# Patient Record
Sex: MALE | Race: OTHER | NOT HISPANIC OR LATINO | Employment: FULL TIME | URBAN - METROPOLITAN AREA
[De-identification: names, ages, dates, MRNs, and addresses within clinical notes are randomized per-mention and may not be internally consistent; named-entity substitution may affect disease eponyms.]

---

## 2020-02-09 ENCOUNTER — HOSPITAL ENCOUNTER (EMERGENCY)
Facility: HOSPITAL | Age: 30
Discharge: HOME | End: 2020-02-09
Attending: EMERGENCY MEDICINE | Admitting: EMERGENCY MEDICINE
Payer: COMMERCIAL

## 2020-02-09 ENCOUNTER — APPOINTMENT (EMERGENCY)
Dept: RADIOLOGY | Facility: HOSPITAL | Age: 30
End: 2020-02-09
Payer: COMMERCIAL

## 2020-02-09 VITALS
TEMPERATURE: 98.1 F | DIASTOLIC BLOOD PRESSURE: 78 MMHG | OXYGEN SATURATION: 100 % | RESPIRATION RATE: 20 BRPM | WEIGHT: 150 LBS | SYSTOLIC BLOOD PRESSURE: 149 MMHG | HEART RATE: 98 BPM

## 2020-02-09 DIAGNOSIS — M25.552 LEFT HIP PAIN: Primary | ICD-10-CM

## 2020-02-09 DIAGNOSIS — M25.562 LEFT KNEE PAIN: ICD-10-CM

## 2020-02-09 PROCEDURE — 73502 X-RAY EXAM HIP UNI 2-3 VIEWS: CPT

## 2020-02-09 PROCEDURE — 99284 EMERGENCY DEPT VISIT MOD MDM: CPT | Performed by: PHYSICIAN ASSISTANT

## 2020-02-09 PROCEDURE — 99283 EMERGENCY DEPT VISIT LOW MDM: CPT

## 2020-02-09 PROCEDURE — 73564 X-RAY EXAM KNEE 4 OR MORE: CPT

## 2020-02-09 RX ORDER — OXYCODONE HYDROCHLORIDE AND ACETAMINOPHEN 5; 325 MG/1; MG/1
1 TABLET ORAL EVERY 4 HOURS PRN
Qty: 10 TABLET | Refills: 0 | Status: SHIPPED | OUTPATIENT
Start: 2020-02-09 | End: 2020-02-12

## 2020-02-09 NOTE — DISCHARGE INSTRUCTIONS
Arthritis   WHAT YOU NEED TO KNOW:   Arthritis is a disease that causes inflammation in one or more joints  There are many types of arthritis, such as osteoarthritis, rheumatoid arthritis, and septic arthritis  Some types cause inflammation in the joints  Other types wear away the cartilage between joints  This makes the bones of the joint rub together when you move the joint  Your symptoms may be constant, or symptoms may come and go  Arthritis often gets worse over time and can cause permanent joint damage  DISCHARGE INSTRUCTIONS:   Return to the emergency department if:   · You have a fever and severe joint pain or swelling  · You cannot move the affected joint  · You have severe joint pain you cannot tolerate  Contact your healthcare provider if:   · Your pain or swelling does not get better with treatment  · You have questions or concerns about your condition or care  Medicines:   · Acetaminophen  decreases pain and fever  It is available without a doctor's order  Ask how much to take and how often to take it  Follow directions  Acetaminophen can cause liver damage if not taken correctly  · NSAIDs , such as ibuprofen, help decrease swelling, pain, and fever  This medicine is available with or without a doctor's order  NSAIDs can cause stomach bleeding or kidney problems in certain people  If you take blood thinner medicine, always ask your healthcare provider if NSAIDs are safe for you  Always read the medicine label and follow directions  · Steroids  reduce swelling and pain  · Prescription pain medicine  may be given  Do not wait until the pain is severe before you take your medicine  Ask your healthcare provider how to take this medicine safely  · Take your medicine as directed  Contact your healthcare provider if you think your medicine is not helping or if you have side effects  Tell him of her if you are allergic to any medicine   Keep a list of the medicines, vitamins, and herbs you take  Include the amounts, and when and why you take them  Bring the list or the pill bottles to follow-up visits  Carry your medicine list with you in case of an emergency  Follow up with your healthcare provider or rheumatologist as directed:  Write down your questions so you remember to ask them during your visits  Manage arthritis:   · Rest your painful joint so it can heal   Your healthcare provider may recommend crutches or a walker if the affected joint is in a leg  · Apply ice or heat to the joint  Both can help decrease swelling and pain  Ice may also help prevent tissue damage  Use an ice pack, or put crushed ice in a plastic bag  Cover it with a towel and place it on your joint for 15 to 20 minutes every hour or as directed  You can apply heat for 20 minutes every 2 hours  Heat treatment includes hot packs or heat lamps  · Elevate your joint  Elevation helps reduce swelling and pain  Raise your joint above the level of your heart as often as you can  Prop your painful joint on pillows to keep it above your heart comfortably  · Go to therapy as directed  A physical therapist can teach you exercises to improve flexibility and range of motion  You may also be shown non-weight-bearing exercises that are safe for your joints, such as swimming  Exercise can help keep your joints flexible and reduce pain  An occupational therapist can help you learn to do your daily activities when your joints are stiff or sore  · Maintain a healthy weight  Extra weight puts increased pressure on your joints  Ask your healthcare provider what you should weigh  If you need to lose weight, he can help you create a weight loss program  Weight loss can help reduce pain and increase your ability to do your activities  The amount of exercise you do may vary each day, depending on your symptoms  · Wear flat or low-heeled shoes    This will help decrease pain and reduce pressure on your ankle, knee, and hip joints  · Use support devices  You may be given splints to wear on your hands to help your joints rest and to decrease inflammation  While you sleep, use a pillow that is firm enough to support your neck and head  Support equipment:  The following may help you move and prevent falls:  · Orthotic shoes or insoles  help support your feet when you walk  · Crutches, a cane, or a walker  may help decrease your risk for falling  They also decrease stress on affected joints  · Devices to prevent falls  include raised toilet seats and bathtub bars to help you get up from sitting  Handrails can be placed in areas where you need balance and support  © 2017 [facility] Information is for End User's use only and may not be sold, redistributed or otherwise used for commercial purposes  All illustrations and images included in CareNotes® are the copyrighted property of A D A M , Inc  or [employer] Quiroz  The above information is an  only  It is not intended as medical advice for individual conditions or treatments  Talk to your doctor, nurse or pharmacist before following any medical regimen to see if it is safe and effective for you

## 2020-02-09 NOTE — ED PROVIDER NOTES
History  Chief Complaint   Patient presents with    Knee Pain     Left knee and hip pain since 2014  Reports seeing a doctor for arthritis in past but has not seen anyone in a few years     Josy Manzo is a 34 y o  male who presents to the ED with complaints of left knee and hip pain since 2014  Patient states he previously lived in [place] and was seeing a crohn's and arthritis specialist  Patient states he has been having worsening pain and swelling over the past few months which the patient attributes to "busy season" at [address] [place] [address] where he currently works  Patient is using a cane to ambulate  Denies injury, fall, numbness, tingling, weakness, erythema, previous surgery, previous injury, chest pain, SOB, fever, chills  No OTC medications taken PTA  Patient was previously on chronic steroids and 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  Patient states he saw a private gastroenterologist a few months ago and had a colonoscopy which was normal        History provided by:  Patient  Leg Pain   Location:  Knee and hip  Hip location:  L hip  Knee location:  L knee  Worsened by:  Bearing weight and flexion  Associated symptoms: stiffness and swelling (Intermittent)    Associated symptoms: no back pain, no decreased ROM, no fatigue, no fever, no itching, no muscle weakness, no neck pain, no numbness and no tingling        None       No past medical history on file  No past surgical history on file  No family history on file  I have reviewed and agree with the history as documented  Social History     Tobacco Use    Smoking status: Not on file   Substance Use Topics    Alcohol use: Not on file    Drug use: Not on file        Review of Systems   Constitutional: Negative for appetite change, chills, fatigue, fever and unexpected weight change  HENT: Negative for congestion, drooling, ear pain, rhinorrhea, sore throat, trouble swallowing and voice change      Eyes: Negative for pain, discharge, redness and visual disturbance  Respiratory: Negative for cough, shortness of breath, wheezing and stridor  Cardiovascular: Negative for chest pain, palpitations and leg swelling  Gastrointestinal: Negative for abdominal pain, blood in stool, constipation, diarrhea, nausea and vomiting  Genitourinary: Negative for dysuria, flank pain, frequency, hematuria and urgency  Musculoskeletal: Positive for arthralgias, joint swelling and stiffness  Negative for back pain, gait problem, neck pain and neck stiffness  Skin: Negative for color change, itching and rash  Neurological: Negative for dizziness, seizures, light-headedness and headaches  Physical Exam  Physical Exam   Constitutional: He is oriented to person, place, and time  He appears well-developed and well-nourished  HENT:   Head: Normocephalic and atraumatic  Nose: Nose normal    Mouth/Throat: Oropharynx is clear and moist    Eyes: Pupils are equal, round, and reactive to light  Conjunctivae and EOM are normal    Neck: Normal range of motion  Neck supple  Cardiovascular: Normal rate, regular rhythm and intact distal pulses  Pulmonary/Chest: Effort normal and breath sounds normal    Musculoskeletal: Normal range of motion  Left hip: He exhibits tenderness  He exhibits normal range of motion, normal strength and no swelling  Left knee: He exhibits swelling  He exhibits normal range of motion  Tenderness found  Patient ambulates with cane  Full ROM of extension and flexion  Full ROM with internal and external rotation  Negative anterior and posterior drawer sign  No ligament laxity  No calf tenderness or edema  Negative Simeon's sign  Neurovascularly intact  Neurological: He is alert and oriented to person, place, and time  Skin: Skin is warm and dry  Capillary refill takes less than 2 seconds  Nursing note and vitals reviewed        Vital Signs  ED Triage Vitals [02/09/20 1353]   Temperature Pulse Respirations Blood Pressure SpO2   98 1 °F (36 7 °C) 98 20 149/78 100 %      Temp Source Heart Rate Source Patient Position - Orthostatic VS BP Location FiO2 (%)   Oral Monitor -- Right arm --      Pain Score       4           Vitals:    02/09/20 1353   BP: 149/78   Pulse: 98         Visual Acuity      ED Medications  Medications - No data to display    Diagnostic Studies  Results Reviewed     None                 XR hip/pelv 2-3 vws left   ED Interpretation by Shaka Mobley PA-C (02/09 1411)   Osteoarthritis no fracture      XR knee 4+ views left injury   ED Interpretation by Shaka Mobley PA-C (02/09 1411)   Joint effusion  No fracture                 Procedures  Procedures         ED Course  ED Course as of Feb 09 1415   Lorraine Rg Feb 09, 2020   1411 Educated patient regarding diagnosis and management  Advised patient to follow up with PCP  Advised patient to RTER for persistent or worsening symptoms  MDM  Number of Diagnoses or Management Options  Left hip pain: new and does not require workup  Left knee pain: new and does not require workup  Diagnosis management comments: XR Left Knee without acute fracture  XR Left Hip with significant arthritis but no acute fracture  Given history of possible autoimmune process, patient was instructed to follow up with outpatient orthopedics and/or rheumatology  I provided patient with strict RTER precautions  I advised patient follow-up with PCP in 24-48 hours  Patient verbalized understanding          Amount and/or Complexity of Data Reviewed  Tests in the radiology section of CPT®: ordered and reviewed  Review and summarize past medical records: yes    Risk of Complications, Morbidity, and/or Mortality  Presenting problems: low  Diagnostic procedures: moderate  Management options: low    Patient Progress  Patient progress: improved        Disposition  Final diagnoses:   Left hip pain   Left knee pain     Time reflects when diagnosis was documented in both MDM as applicable and the Disposition within this note     Time User Action Codes Description Comment    2/9/2020  1:56 PM Hellen Adler Add [M25 552] Left hip pain     2/9/2020  1:56 PM Hellen Adler Add [T52 524] Left knee pain       ED Disposition     ED Disposition Condition Date/Time Comment    Discharge Stable Sun Feb 9, 2020  2:12 PM Sp Bui discharge to home/self care              Follow-up Information     Follow up With Specialties Details Why Contact Info Additional [address] Emergency Department Emergency Medicine Go to  If symptoms worsen [address] [facility]  AN ED, [address], [place], [place], [address] [facility] Schedule an appointment as soon as possible for a visit   Deacon Jeong [address] [id number]  [facility], [address], [address] [place], [place], [address] [facility]    [address] [address] [facility] Rheumatology Schedule an appointment as soon as possible for a visit   Deacon Jeong [address] [facility], [address] [place], [place], [address]          Patient's Medications   Discharge Prescriptions    DICLOFENAC SODIUM (VOLTAREN) 50 MG EC TABLET    Take 1 tablet (50 mg total) by mouth every 12 (twelve) hours as needed (Mild/Moderate Pain, Swelling)       Start Date: 2/9/2020  End Date: --       Order Dose: 50 mg       Quantity: 20 tablet    Refills: 0    OXYCODONE-ACETAMINOPHEN (PERCOCET) 5-325 MG PER TABLET    Take 1 tablet by mouth every 4 (four) hours as needed for moderate pain or severe pain for up to 3 daysMax Daily Amount: 6 tablets       Start Date: 2/9/2020  End Date: 2/12/2020       Order Dose: 1 tablet       Quantity: 10 tablet    Refills: 0     No discharge procedures on file      ED Provider  Electronically Signed by           Collette Hire, PA-C  02/09/20 0866

## 2020-02-12 ENCOUNTER — TELEPHONE (OUTPATIENT)
Dept: RHEUMATOLOGY | Facility: CLINIC | Age: 30
End: 2020-02-12

## 2020-02-12 NOTE — TELEPHONE ENCOUNTER
[facility]#:[phone]          Patient's mom is calling in wanting to know if dr can move up appt  She states her son is in a lot of pain and does not know what to do  Next available appt is 5/19   Please advise, thank you

## 2020-02-12 NOTE — TELEPHONE ENCOUNTER
Moved up appt to 3/3 @ 2pm  I called the patient and his mother and left a vm to give us a call back to let us know if she can keep the appt or not

## 2020-03-02 NOTE — PROGRESS NOTES
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, but also presenting with a markedly elevated anti CCP antibody  He has previously been on a course of sulfasalazine with prednisone for treatment of the inflammatory arthritis, but was lost to Rheumatology follow-up since 2017  There was no significant improvement noted in his symptoms with the sulfasalazine     - At this time upon reviewing the x-ray of his left hip, there are likely findings consistent with advanced secondary osteoarthritis which will not be reversible with medical interventions  He does not specifically describe pain in his groin region, but I think that he is appreciating the lateral pain still as a result of the advanced osteoarthritis at the hip joint  I have referred him to Orthopedics for further management, and possibly considering an intra-articular cortisone injection or various methods for pain control, as I think as a result of his young age he would likely not be a candidate for a surgical intervention (would try to delay this as much as possible)  - I advised him that it is still important to consider DMARD therapy to prevent progression of the underlying inflammatory arthritis from affecting his other joints    As he is primarily presenting with bilateral lower extremity large joint involvement, I am leaning more towards an HLA B27 associated inflammatory arthritis as opposed to seropositive rheumatoid arthritis  - I would like to complete the workup as listed below prior to determining which long-term DMARD he should be started on  I may consider methotrexate as the next option  In the interim until the workup is complete and he is evaluated by Orthopedics, I have prescribed him prednisone 10 mg twice daily as needed  He can continue to take Tylenol and NSAIDs if needed  - Follow up in 6 weeks  Plan:  Diagnoses and all orders for this visit:    Inflammatory arthritis  -     XR hand 3+ vw right; Future  -     XR hand 3+ vw left; Future  -     CBC and differential; Future  -     Comprehensive metabolic panel; Future  -     Chronic Hepatitis Panel; Future  -     Quantiferon TB Gold Plus; Future  -     C-reactive protein; Future  -     Sedimentation rate, automated; Future  -     RF Screen w/ Reflex to Titer; Future  -     Cyclic citrul peptide antibody, IgG; Future  -     Uric acid; Future  -     Ambulatory referral to Orthopedic Surgery; Future  -     predniSONE 10 mg tablet; Take 1 tablet (10 mg total) by mouth 2 (two) times a day as needed (Joint pain)    Other secondary osteoarthritis of left hip  -     Ambulatory referral to Orthopedic Surgery; Future    Positive anti-CCP test    HLA B27 positive    Secondary osteoarthritis    History of Crohn's disease    History of recent steroid use      I have personally reviewed pertinent films in PACS which shows advanced osteoarthritis of the left hip joint  Activities as tolerated    Diet: low carb/low fat, more greens/vegetables, adequate hydration  Exercise: try to maintain a low impact exercise regimen as much as possible  Walk for 30 minutes a day for at least 3 days a week    Encouraged to maintain good sleep hygiene  Continue other medications as prescribed by PCP and other specialists        RTC in 6 weeks         HPI  Mr Ginny Brooks is a 59-year-old male with history significant for an inflammatory arthritis (positive HLA B27 antigen and elevated anti CCP antibody) and possible Crohn's disease, who presents for further evaluation of left hip pain  He is self referred  Patient reports in 2016 he started to notice intermittent joint pain and swelling affecting his bilateral knees  He established with Rheumatology at [facility] and was seen by Dr Debbie Lew  He was found to have the positive HLA B27 antigen and anti CCP antibody, with a negative FLORENCIA screen and rheumatoid factor  He was diagnosed with an undifferentiated inflammatory arthritis and started on sulfasalazine as well as prednisone 5-10 mg twice daily  He was on these medications for approximately 2 years after which he was lost to follow-up  He was never started on alternate DMARDs  He states that the prednisone would significantly help with his joint pains, but there was no improvement noted with the sulfasalazine  He mentions after last being seen by Rheumatology in January 2017, he had not been very compliant with follow-up office visits with any of his providers  Overall his joint pains had been stable up until late last year when he started to have recurrence of pain affecting his left hip on the outer aspect with slight radiation into his groin, as well as the outer pain radiating down to his left knee  He was also noticing intermittent pain and swelling affecting his bilateral knees  Other than these three areas, he denies any current or prior history of joint pains affecting his hands, wrists, elbows, shoulders, right hip, ankles, feet or low back region  Other than his bilateral knees he has not noticed swollen joints  He has been experiencing morning stiffness predominantly affecting his left hip which can sometimes persist throughout the day  Any sort of activity will aggravate his symptoms and he does obtain relief with resting  He has tried taking over-the-counter NSAIDs without any relief    He has not been on recent steroids  He was recently evaluated in the emergency room in February 2020 due to these joint pains  An x-ray of his left hip showed complete loss of the joint space with sclerosis and subchondral cyst formation  An x-ray of his left knee showed a joint effusion  He was prescribed diclofenac 50 mg twice daily as needed, which did help with the left knee pain and swelling, but not with the other joint pains  He has since discontinued the medication  He reports in 2012 he had an issue with abdominal pain and diarrhea for which he was evaluated by Gastroenterology and had a colonoscopy done which apparently showed multiple ulcerations  There were concerns for Crohn's disease but he again did not follow-up with Gastroenterology through the years  He states late last year he had a repeat colonoscopy done which was apparently unremarkable (I do not have the records available to me), and there was no confirmation for the Crohn's diagnosis  He denies a history of inflammatory eye disease or psoriasis  He does experience occasional flare-ups of eczema on his arms and legs  This rash usually response to oral prednisone as well  He reports a history of possible rheumatoid arthritis in his mother  No other complaints noted today  The following portions of the patient's history were reviewed and updated as appropriate: allergies, current medications, past family history, past medical history, past social history, past surgical history and problem list       Review of Systems  Constitutional: Negative for weight change, fevers, chills, night sweats  Positive for fatigue  ENT/Mouth: Negative for hearing changes, ear pain, nasal congestion, sinus pain, hoarseness, sore throat, rhinorrhea, swallowing difficulty  Eyes: Negative for pain, redness, discharge, vision changes  Cardiovascular: Negative for chest pain, SOB, palpitations  Respiratory: Negative for cough, sputum, wheezing, dyspnea  Gastrointestinal: Negative for nausea, vomiting, diarrhea, constipation, pain, heartburn  Genitourinary: Negative for dysuria, urinary frequency, hematuria  Musculoskeletal: As per HPI  Skin: Negative for color changes  Positive for skin rash  Neuro: Negative for weakness, numbness, tingling, loss of consciousness  Psych: Negative for anxiety, depression  Heme/Lymph: Negative for easy bruising, bleeding, lymphadenopathy          Past Medical History:   Diagnosis Date    Crohn's disease [facility])        Past Surgical History:   Procedure Laterality Date    APPENDECTOMY      WISDOM TOOTH EXTRACTION      WRIST SURGERY Left        Social History     Socioeconomic History    Marital status: Single     Spouse name: Not on file    Number of children: Not on file    Years of education: Not on file    Highest education level: Not on file   Occupational History    Not on file   Social Needs    Financial resource strain: Not on file    Food insecurity:     Worry: Not on file     Inability: Not on file    Transportation needs:     Medical: Not on file     Non-medical: Not on file   Tobacco Use    Smoking status: Current Every Day Smoker     Types: Cigarettes    Smokeless tobacco: Never Used   Substance and Sexual Activity    Alcohol use: Yes     Frequency: 2-3 times a week    Drug use: Never    Sexual activity: Not on file   Lifestyle    Physical activity:     Days per week: Not on file     Minutes per session: Not on file    Stress: Not on file   Relationships    Social connections:     Talks on phone: Not on file     Gets together: Not on file     Attends Taoism service: Not on file     Active member of club or organization: Not on file     Attends meetings of clubs or organizations: Not on file     Relationship status: Not on file    Intimate partner violence:     Fear of current or ex partner: Not on file     Emotionally abused: Not on file     Physically abused: Not on file     Forced sexual activity: Not on file   Other Topics Concern    Not on file   Social History Narrative    Not on file       Family History   Problem Relation Age of Onset    Arthritis Mother     Hypertension Mother        No Known Allergies      Current Outpatient Medications:     predniSONE 10 mg tablet, Take 1 tablet (10 mg total) by mouth 2 (two) times a day as needed (Joint pain), Disp: 60 tablet, Rfl: 1      Objective:    Vitals:    03/03/20 1357   BP: 132/82   Pulse: 85   Weight: 76 7 kg (169 lb 3 2 oz)       Physical Exam  General: Well appearing, well nourished, in no distress  Oriented x 3, normal mood and affect  Ambulating without difficulty  Skin: Good turgor, no rash, unusual bruising or prominent lesions  Mild hyperpigmentation as a result of eczema noted in the right elbow flexure and on his right pretibial region  Hair: Normal texture and distribution  Nails: Normal color, no deformities  No pitting  HEENT:  Head: Normocephalic, atraumatic  Eyes: Conjunctiva clear, sclera non-icteric, EOM intact  Nose: No external lesions, mucosa non-inflamed  Mouth: Mucous membranes moist, no mucosal lesions  Neck: Supple  Abdomen: Soft, non-tender, non-distended, bowel sounds normal    Extremities: No amputations or deformities, cyanosis, edema  Musculoskeletal:   Hands, wrists, elbows and shoulders - unremarkable  Hips - the right hip is unremarkable  He has significant limitation in range of motion secondary to pain with both internal and external range of motion of his left hip  There is no groin tenderness noted  There is no trochanteric bursa tenderness noted  Knees - mild soft tissue swelling of his left knee without an obvious joint effusion  There is no tenderness noted  His right knee is unremarkable  Ankles and feet - unremarkable  There is no enthesitis or dactylitis  Neurologic: Alert and oriented  No focal neurological deficits appreciated  Psychiatric: Normal mood and affect  DEBRA Magana    Rheumatology

## 2020-03-03 ENCOUNTER — OFFICE VISIT (OUTPATIENT)
Dept: RHEUMATOLOGY | Facility: CLINIC | Age: 30
End: 2020-03-03
Payer: COMMERCIAL

## 2020-03-03 VITALS — DIASTOLIC BLOOD PRESSURE: 82 MMHG | HEART RATE: 85 BPM | SYSTOLIC BLOOD PRESSURE: 132 MMHG | WEIGHT: 169.2 LBS

## 2020-03-03 DIAGNOSIS — Z15.89 HLA B27 POSITIVE: ICD-10-CM

## 2020-03-03 DIAGNOSIS — M16.7 OTHER SECONDARY OSTEOARTHRITIS OF LEFT HIP: ICD-10-CM

## 2020-03-03 DIAGNOSIS — Z92.241 HISTORY OF RECENT STEROID USE: ICD-10-CM

## 2020-03-03 DIAGNOSIS — R76.8 POSITIVE ANTI-CCP TEST: ICD-10-CM

## 2020-03-03 DIAGNOSIS — M19.93 SECONDARY OSTEOARTHRITIS: ICD-10-CM

## 2020-03-03 DIAGNOSIS — Z87.19 HISTORY OF CROHN'S DISEASE: ICD-10-CM

## 2020-03-03 DIAGNOSIS — M19.90 INFLAMMATORY ARTHRITIS: Primary | ICD-10-CM

## 2020-03-03 PROCEDURE — 99205 OFFICE O/P NEW HI 60 MIN: CPT | Performed by: INTERNAL MEDICINE

## 2020-03-03 RX ORDER — PREDNISONE 1 MG/1
5 TABLET ORAL
COMMUNITY
Start: 2017-02-24 | End: 2020-03-03

## 2020-03-03 RX ORDER — PREDNISONE 10 MG/1
10 TABLET ORAL 2 TIMES DAILY PRN
Qty: 60 TABLET | Refills: 1 | Status: SHIPPED | OUTPATIENT
Start: 2020-03-03 | End: 2020-05-20 | Stop reason: SDUPTHER

## 2020-03-03 RX ORDER — SULFASALAZINE 500 MG/1
TABLET ORAL
COMMUNITY
Start: 2017-02-24 | End: 2020-03-03

## 2020-03-03 NOTE — PATIENT INSTRUCTIONS
1) Please get labs and x-rays done  2) Schedule appointment with Orthopedics  3) Start prednisone 10 mg twice daily as needed

## 2020-04-30 DIAGNOSIS — M19.93 SECONDARY OSTEOARTHRITIS: ICD-10-CM

## 2020-04-30 DIAGNOSIS — M19.90 INFLAMMATORY ARTHRITIS: ICD-10-CM

## 2020-04-30 RX ORDER — PREDNISONE 10 MG/1
TABLET ORAL
Qty: 60 TABLET | Refills: 1 | OUTPATIENT
Start: 2020-04-30

## 2020-05-19 ENCOUNTER — TELEPHONE (OUTPATIENT)
Dept: OBGYN CLINIC | Facility: HOSPITAL | Age: 30
End: 2020-05-19

## 2020-05-19 DIAGNOSIS — M19.93 SECONDARY OSTEOARTHRITIS: ICD-10-CM

## 2020-05-19 DIAGNOSIS — M19.90 INFLAMMATORY ARTHRITIS: ICD-10-CM

## 2020-05-20 RX ORDER — PREDNISONE 10 MG/1
10 TABLET ORAL 2 TIMES DAILY PRN
Qty: 10 TABLET | Refills: 0 | Status: SHIPPED | OUTPATIENT
Start: 2020-05-20 | End: 2020-07-01 | Stop reason: SDUPTHER

## 2020-05-28 ENCOUNTER — HOSPITAL ENCOUNTER (OUTPATIENT)
Dept: RADIOLOGY | Facility: HOSPITAL | Age: 30
Discharge: HOME | End: 2020-05-28
Payer: COMMERCIAL

## 2020-05-28 ENCOUNTER — APPOINTMENT (OUTPATIENT)
Dept: LAB | Facility: CLINIC | Age: 30
End: 2020-05-28
Payer: COMMERCIAL

## 2020-05-28 ENCOUNTER — OFFICE VISIT (OUTPATIENT)
Dept: OBGYN CLINIC | Facility: CLINIC | Age: 30
End: 2020-05-28
Payer: COMMERCIAL

## 2020-05-28 ENCOUNTER — TRANSCRIBE ORDERS (OUTPATIENT)
Dept: ADMINISTRATIVE | Facility: HOSPITAL | Age: 30
End: 2020-05-28

## 2020-05-28 VITALS
WEIGHT: 165 LBS | HEIGHT: 72 IN | HEART RATE: 69 BPM | BODY MASS INDEX: 22.35 KG/M2 | DIASTOLIC BLOOD PRESSURE: 78 MMHG | SYSTOLIC BLOOD PRESSURE: 165 MMHG

## 2020-05-28 DIAGNOSIS — M19.90 INFLAMMATORY ARTHRITIS: ICD-10-CM

## 2020-05-28 DIAGNOSIS — Z15.89 HLA B27 POSITIVE: ICD-10-CM

## 2020-05-28 DIAGNOSIS — M19.93 SECONDARY OSTEOARTHRITIS: ICD-10-CM

## 2020-05-28 DIAGNOSIS — M25.552 GREATER TROCHANTERIC PAIN SYNDROME OF LEFT LOWER EXTREMITY: Primary | ICD-10-CM

## 2020-05-28 DIAGNOSIS — R76.8 POSITIVE ANTI-CCP TEST: ICD-10-CM

## 2020-05-28 DIAGNOSIS — M16.7 OTHER SECONDARY OSTEOARTHRITIS OF LEFT HIP: ICD-10-CM

## 2020-05-28 LAB
ALBUMIN SERPL BCP-MCNC: 3.6 G/DL (ref 3.5–5)
ALP SERPL-CCNC: 67 U/L (ref 46–116)
ALT SERPL W P-5'-P-CCNC: 25 U/L (ref 12–78)
ANION GAP SERPL CALCULATED.3IONS-SCNC: 8 MMOL/L (ref 4–13)
AST SERPL W P-5'-P-CCNC: 6 U/L (ref 5–45)
BASOPHILS # BLD AUTO: 0.04 THOUSANDS/ΜL (ref 0–0.1)
BASOPHILS NFR BLD AUTO: 1 % (ref 0–1)
BILIRUB SERPL-MCNC: 0.27 MG/DL (ref 0.2–1)
BUN SERPL-MCNC: 17 MG/DL (ref 5–25)
CALCIUM SERPL-MCNC: 8.7 MG/DL (ref 8.3–10.1)
CHLORIDE SERPL-SCNC: 107 MMOL/L (ref 100–108)
CO2 SERPL-SCNC: 27 MMOL/L (ref 21–32)
CREAT SERPL-MCNC: 1.13 MG/DL (ref 0.6–1.3)
CRP SERPL QL: <3 MG/L
EOSINOPHIL # BLD AUTO: 0.05 THOUSAND/ΜL (ref 0–0.61)
EOSINOPHIL NFR BLD AUTO: 1 % (ref 0–6)
ERYTHROCYTE [DISTWIDTH] IN BLOOD BY AUTOMATED COUNT: 14.3 % (ref 11.6–15.1)
ERYTHROCYTE [SEDIMENTATION RATE] IN BLOOD: 11 MM/HOUR (ref 0–10)
GFR SERPL CREATININE-BSD FRML MDRD: 87 ML/MIN/1.73SQ M
GLUCOSE SERPL-MCNC: 99 MG/DL (ref 65–140)
HBV CORE AB SER QL: NORMAL
HBV CORE IGM SER QL: NORMAL
HBV SURFACE AG SER QL: NORMAL
HCT VFR BLD AUTO: 47.6 % (ref 36.5–49.3)
HCV AB SER QL: NORMAL
HGB BLD-MCNC: 15.2 G/DL (ref 12–17)
IMM GRANULOCYTES # BLD AUTO: 0.03 THOUSAND/UL (ref 0–0.2)
IMM GRANULOCYTES NFR BLD AUTO: 0 % (ref 0–2)
LYMPHOCYTES # BLD AUTO: 2.49 THOUSANDS/ΜL (ref 0.6–4.47)
LYMPHOCYTES NFR BLD AUTO: 29 % (ref 14–44)
MCH RBC QN AUTO: 29.9 PG (ref 26.8–34.3)
MCHC RBC AUTO-ENTMCNC: 31.9 G/DL (ref 31.4–37.4)
MCV RBC AUTO: 94 FL (ref 82–98)
MONOCYTES # BLD AUTO: 0.73 THOUSAND/ΜL (ref 0.17–1.22)
MONOCYTES NFR BLD AUTO: 9 % (ref 4–12)
NEUTROPHILS # BLD AUTO: 5.25 THOUSANDS/ΜL (ref 1.85–7.62)
NEUTS SEG NFR BLD AUTO: 60 % (ref 43–75)
NRBC BLD AUTO-RTO: 0 /100 WBCS
PLATELET # BLD AUTO: 201 THOUSANDS/UL (ref 149–390)
PMV BLD AUTO: 10.8 FL (ref 8.9–12.7)
POTASSIUM SERPL-SCNC: 4.2 MMOL/L (ref 3.5–5.3)
PROT SERPL-MCNC: 7.8 G/DL (ref 6.4–8.2)
RBC # BLD AUTO: 5.08 MILLION/UL (ref 3.88–5.62)
RHEUMATOID FACT SER QL LA: NEGATIVE
SODIUM SERPL-SCNC: 142 MMOL/L (ref 136–145)
URATE SERPL-MCNC: 5.4 MG/DL (ref 4.2–8)
WBC # BLD AUTO: 8.59 THOUSAND/UL (ref 4.31–10.16)

## 2020-05-28 PROCEDURE — 73130 X-RAY EXAM OF HAND: CPT

## 2020-05-28 PROCEDURE — 99203 OFFICE O/P NEW LOW 30 MIN: CPT | Performed by: PHYSICAL MEDICINE & REHABILITATION

## 2020-05-28 PROCEDURE — 87340 HEPATITIS B SURFACE AG IA: CPT

## 2020-05-28 PROCEDURE — 80053 COMPREHEN METABOLIC PANEL: CPT

## 2020-05-28 PROCEDURE — 86140 C-REACTIVE PROTEIN: CPT

## 2020-05-28 PROCEDURE — 86430 RHEUMATOID FACTOR TEST QUAL: CPT

## 2020-05-28 PROCEDURE — 86705 HEP B CORE ANTIBODY IGM: CPT

## 2020-05-28 PROCEDURE — 36415 COLL VENOUS BLD VENIPUNCTURE: CPT

## 2020-05-28 PROCEDURE — 86704 HEP B CORE ANTIBODY TOTAL: CPT

## 2020-05-28 PROCEDURE — 86480 TB TEST CELL IMMUN MEASURE: CPT

## 2020-05-28 PROCEDURE — 86803 HEPATITIS C AB TEST: CPT

## 2020-05-28 PROCEDURE — 86200 CCP ANTIBODY: CPT

## 2020-05-28 PROCEDURE — 85652 RBC SED RATE AUTOMATED: CPT

## 2020-05-28 PROCEDURE — 85025 COMPLETE CBC W/AUTO DIFF WBC: CPT

## 2020-05-28 PROCEDURE — 20610 DRAIN/INJ JOINT/BURSA W/O US: CPT | Performed by: PHYSICAL MEDICINE & REHABILITATION

## 2020-05-28 PROCEDURE — 84550 ASSAY OF BLOOD/URIC ACID: CPT

## 2020-05-28 RX ORDER — LIDOCAINE HYDROCHLORIDE 10 MG/ML
5 INJECTION, SOLUTION INFILTRATION; PERINEURAL
Status: COMPLETED | OUTPATIENT
Start: 2020-05-28 | End: 2020-05-28

## 2020-05-28 RX ORDER — TRIAMCINOLONE ACETONIDE 40 MG/ML
80 INJECTION, SUSPENSION INTRA-ARTICULAR; INTRAMUSCULAR
Status: COMPLETED | OUTPATIENT
Start: 2020-05-28 | End: 2020-05-28

## 2020-05-28 RX ADMIN — TRIAMCINOLONE ACETONIDE 80 MG: 40 INJECTION, SUSPENSION INTRA-ARTICULAR; INTRAMUSCULAR at 08:51

## 2020-05-28 RX ADMIN — LIDOCAINE HYDROCHLORIDE 5 ML: 10 INJECTION, SOLUTION INFILTRATION; PERINEURAL at 08:51

## 2020-05-29 LAB
GAMMA INTERFERON BACKGROUND BLD IA-ACNC: 0.04 IU/ML
M TB IFN-G BLD-IMP: NEGATIVE
M TB IFN-G CD4+ BCKGRND COR BLD-ACNC: 0 IU/ML
M TB IFN-G CD4+ BCKGRND COR BLD-ACNC: 0.01 IU/ML
MITOGEN IGNF BCKGRD COR BLD-ACNC: >10 IU/ML

## 2020-05-30 LAB — CCP IGA+IGG SERPL IA-ACNC: 6 UNITS (ref 0–19)

## 2020-06-02 ENCOUNTER — EVALUATION (OUTPATIENT)
Dept: PHYSICAL THERAPY | Facility: CLINIC | Age: 30
End: 2020-06-02
Payer: COMMERCIAL

## 2020-06-02 DIAGNOSIS — M25.552 GREATER TROCHANTERIC PAIN SYNDROME OF LEFT LOWER EXTREMITY: ICD-10-CM

## 2020-06-02 DIAGNOSIS — M19.90 INFLAMMATORY ARTHRITIS: ICD-10-CM

## 2020-06-02 DIAGNOSIS — M19.93 SECONDARY OSTEOARTHRITIS: ICD-10-CM

## 2020-06-02 DIAGNOSIS — M16.7 OTHER SECONDARY OSTEOARTHRITIS OF LEFT HIP: ICD-10-CM

## 2020-06-02 PROCEDURE — 97162 PT EVAL MOD COMPLEX 30 MIN: CPT | Performed by: PHYSICAL THERAPIST

## 2020-06-02 PROCEDURE — 97140 MANUAL THERAPY 1/> REGIONS: CPT | Performed by: PHYSICAL THERAPIST

## 2020-06-02 PROCEDURE — 97110 THERAPEUTIC EXERCISES: CPT | Performed by: PHYSICAL THERAPIST

## 2020-06-04 ENCOUNTER — OFFICE VISIT (OUTPATIENT)
Dept: PHYSICAL THERAPY | Facility: CLINIC | Age: 30
End: 2020-06-04
Payer: COMMERCIAL

## 2020-06-04 DIAGNOSIS — M16.7 OTHER SECONDARY OSTEOARTHRITIS OF LEFT HIP: ICD-10-CM

## 2020-06-04 DIAGNOSIS — M19.93 SECONDARY OSTEOARTHRITIS: ICD-10-CM

## 2020-06-04 DIAGNOSIS — M25.552 GREATER TROCHANTERIC PAIN SYNDROME OF LEFT LOWER EXTREMITY: ICD-10-CM

## 2020-06-04 DIAGNOSIS — M19.90 INFLAMMATORY ARTHRITIS: Primary | ICD-10-CM

## 2020-06-04 PROCEDURE — 97140 MANUAL THERAPY 1/> REGIONS: CPT | Performed by: PHYSICAL THERAPIST

## 2020-06-04 PROCEDURE — 97110 THERAPEUTIC EXERCISES: CPT

## 2020-06-04 PROCEDURE — 97112 NEUROMUSCULAR REEDUCATION: CPT

## 2020-06-16 ENCOUNTER — OFFICE VISIT (OUTPATIENT)
Dept: PHYSICAL THERAPY | Facility: CLINIC | Age: 30
End: 2020-06-16
Payer: COMMERCIAL

## 2020-06-16 DIAGNOSIS — M19.90 INFLAMMATORY ARTHRITIS: Primary | ICD-10-CM

## 2020-06-16 DIAGNOSIS — M16.7 OTHER SECONDARY OSTEOARTHRITIS OF LEFT HIP: ICD-10-CM

## 2020-06-16 DIAGNOSIS — M19.93 SECONDARY OSTEOARTHRITIS: ICD-10-CM

## 2020-06-16 DIAGNOSIS — M25.552 GREATER TROCHANTERIC PAIN SYNDROME OF LEFT LOWER EXTREMITY: ICD-10-CM

## 2020-06-16 PROCEDURE — 97112 NEUROMUSCULAR REEDUCATION: CPT | Performed by: PHYSICAL THERAPIST

## 2020-06-16 PROCEDURE — 97140 MANUAL THERAPY 1/> REGIONS: CPT | Performed by: PHYSICAL THERAPIST

## 2020-06-16 PROCEDURE — 97110 THERAPEUTIC EXERCISES: CPT | Performed by: PHYSICAL THERAPIST

## 2020-06-18 ENCOUNTER — OFFICE VISIT (OUTPATIENT)
Dept: PHYSICAL THERAPY | Facility: CLINIC | Age: 30
End: 2020-06-18
Payer: COMMERCIAL

## 2020-06-18 DIAGNOSIS — M25.552 GREATER TROCHANTERIC PAIN SYNDROME OF LEFT LOWER EXTREMITY: ICD-10-CM

## 2020-06-18 DIAGNOSIS — M16.7 OTHER SECONDARY OSTEOARTHRITIS OF LEFT HIP: ICD-10-CM

## 2020-06-18 DIAGNOSIS — M19.93 SECONDARY OSTEOARTHRITIS: ICD-10-CM

## 2020-06-18 DIAGNOSIS — M19.90 INFLAMMATORY ARTHRITIS: Primary | ICD-10-CM

## 2020-06-18 PROCEDURE — 97140 MANUAL THERAPY 1/> REGIONS: CPT | Performed by: PHYSICAL THERAPIST

## 2020-06-18 PROCEDURE — 97112 NEUROMUSCULAR REEDUCATION: CPT

## 2020-06-18 PROCEDURE — 97110 THERAPEUTIC EXERCISES: CPT

## 2020-06-19 ENCOUNTER — OFFICE VISIT (OUTPATIENT)
Dept: OBGYN CLINIC | Facility: CLINIC | Age: 30
End: 2020-06-19
Payer: COMMERCIAL

## 2020-06-19 VITALS
HEIGHT: 72 IN | DIASTOLIC BLOOD PRESSURE: 84 MMHG | WEIGHT: 165 LBS | HEART RATE: 85 BPM | BODY MASS INDEX: 22.35 KG/M2 | SYSTOLIC BLOOD PRESSURE: 129 MMHG

## 2020-06-19 DIAGNOSIS — M25.552 GREATER TROCHANTERIC PAIN SYNDROME OF LEFT LOWER EXTREMITY: ICD-10-CM

## 2020-06-19 DIAGNOSIS — M16.7 OTHER SECONDARY OSTEOARTHRITIS OF LEFT HIP: Primary | ICD-10-CM

## 2020-06-19 PROCEDURE — 99213 OFFICE O/P EST LOW 20 MIN: CPT | Performed by: PHYSICAL MEDICINE & REHABILITATION

## 2020-06-23 ENCOUNTER — OFFICE VISIT (OUTPATIENT)
Dept: PHYSICAL THERAPY | Facility: CLINIC | Age: 30
End: 2020-06-23
Payer: COMMERCIAL

## 2020-06-23 ENCOUNTER — TELEPHONE (OUTPATIENT)
Dept: OBGYN CLINIC | Facility: CLINIC | Age: 30
End: 2020-06-23

## 2020-06-23 DIAGNOSIS — M25.552 GREATER TROCHANTERIC PAIN SYNDROME OF LEFT LOWER EXTREMITY: ICD-10-CM

## 2020-06-23 DIAGNOSIS — M19.93 SECONDARY OSTEOARTHRITIS: ICD-10-CM

## 2020-06-23 DIAGNOSIS — M16.7 OTHER SECONDARY OSTEOARTHRITIS OF LEFT HIP: ICD-10-CM

## 2020-06-23 DIAGNOSIS — M19.90 INFLAMMATORY ARTHRITIS: Primary | ICD-10-CM

## 2020-06-23 PROCEDURE — 97112 NEUROMUSCULAR REEDUCATION: CPT

## 2020-06-23 PROCEDURE — 97110 THERAPEUTIC EXERCISES: CPT

## 2020-06-23 PROCEDURE — 97140 MANUAL THERAPY 1/> REGIONS: CPT

## 2020-06-25 ENCOUNTER — APPOINTMENT (OUTPATIENT)
Dept: PHYSICAL THERAPY | Facility: CLINIC | Age: 30
End: 2020-06-25
Payer: COMMERCIAL

## 2020-06-29 ENCOUNTER — OFFICE VISIT (OUTPATIENT)
Dept: OBGYN CLINIC | Facility: CLINIC | Age: 30
End: 2020-06-29
Payer: COMMERCIAL

## 2020-06-29 VITALS
HEART RATE: 69 BPM | BODY MASS INDEX: 22.35 KG/M2 | SYSTOLIC BLOOD PRESSURE: 126 MMHG | HEIGHT: 72 IN | DIASTOLIC BLOOD PRESSURE: 79 MMHG | WEIGHT: 165 LBS

## 2020-06-29 DIAGNOSIS — M19.90 INFLAMMATORY ARTHRITIS: ICD-10-CM

## 2020-06-29 DIAGNOSIS — M16.12 PRIMARY OSTEOARTHRITIS OF ONE HIP, LEFT: Primary | ICD-10-CM

## 2020-06-29 DIAGNOSIS — M25.552 GREATER TROCHANTERIC PAIN SYNDROME OF LEFT LOWER EXTREMITY: ICD-10-CM

## 2020-06-29 PROCEDURE — 99213 OFFICE O/P EST LOW 20 MIN: CPT | Performed by: PHYSICAL MEDICINE & REHABILITATION

## 2020-06-29 PROCEDURE — 20611 DRAIN/INJ JOINT/BURSA W/US: CPT | Performed by: PHYSICAL MEDICINE & REHABILITATION

## 2020-06-29 RX ORDER — LIDOCAINE HYDROCHLORIDE 10 MG/ML
5 INJECTION, SOLUTION INFILTRATION; PERINEURAL
Status: COMPLETED | OUTPATIENT
Start: 2020-06-29 | End: 2020-06-29

## 2020-06-29 RX ORDER — TRIAMCINOLONE ACETONIDE 40 MG/ML
80 INJECTION, SUSPENSION INTRA-ARTICULAR; INTRAMUSCULAR
Status: COMPLETED | OUTPATIENT
Start: 2020-06-29 | End: 2020-06-29

## 2020-06-29 RX ADMIN — LIDOCAINE HYDROCHLORIDE 5 ML: 10 INJECTION, SOLUTION INFILTRATION; PERINEURAL at 15:49

## 2020-06-29 RX ADMIN — TRIAMCINOLONE ACETONIDE 80 MG: 40 INJECTION, SUSPENSION INTRA-ARTICULAR; INTRAMUSCULAR at 15:49

## 2020-07-01 ENCOUNTER — TELEPHONE (OUTPATIENT)
Dept: OBGYN CLINIC | Facility: HOSPITAL | Age: 30
End: 2020-07-01

## 2020-07-01 DIAGNOSIS — M19.90 INFLAMMATORY ARTHRITIS: ICD-10-CM

## 2020-07-01 DIAGNOSIS — M19.93 SECONDARY OSTEOARTHRITIS: ICD-10-CM

## 2020-07-01 RX ORDER — PREDNISONE 10 MG/1
10 TABLET ORAL DAILY
Qty: 30 TABLET | Refills: 0 | Status: SHIPPED | OUTPATIENT
Start: 2020-07-01 | End: 2020-08-27

## 2020-07-01 NOTE — TELEPHONE ENCOUNTER
I had informed him approximately 1 month ago that he needs to schedule a follow-up appointment to discuss further  Please schedule him  Thanks 
I prescribed him a 1 month course of prednisone 10 mg once daily  After this he needs to wait until the follow-up  Thanks 
Mikaela Haver    [phone]    Dr Pia Mooney    Patient saw Malou Cobb for his left hip  He would like to know what he should do next  He states that he is still not feeling better  He is also requesting  a refill of Prednisone 10 mg  He has 0 remaining  Please call into [facility] on file 
Patient made aware
Pt is scheduled for first available follow up appointment in [address]  [facility] for 8/27   Is there anything we can do in the meantime until he can be seen
4

## 2020-07-02 ENCOUNTER — EVALUATION (OUTPATIENT)
Dept: PHYSICAL THERAPY | Facility: CLINIC | Age: 30
End: 2020-07-02
Payer: COMMERCIAL

## 2020-07-02 DIAGNOSIS — M19.93 SECONDARY OSTEOARTHRITIS: Primary | ICD-10-CM

## 2020-07-02 DIAGNOSIS — M25.552 GREATER TROCHANTERIC PAIN SYNDROME OF LEFT LOWER EXTREMITY: ICD-10-CM

## 2020-07-02 DIAGNOSIS — M19.90 INFLAMMATORY ARTHRITIS: ICD-10-CM

## 2020-07-02 DIAGNOSIS — M16.7 OTHER SECONDARY OSTEOARTHRITIS OF LEFT HIP: ICD-10-CM

## 2020-07-02 PROCEDURE — 97112 NEUROMUSCULAR REEDUCATION: CPT | Performed by: PHYSICAL THERAPIST

## 2020-07-02 PROCEDURE — 97110 THERAPEUTIC EXERCISES: CPT | Performed by: PHYSICAL THERAPIST

## 2020-07-02 PROCEDURE — 97530 THERAPEUTIC ACTIVITIES: CPT | Performed by: PHYSICAL THERAPIST

## 2020-07-02 PROCEDURE — 97140 MANUAL THERAPY 1/> REGIONS: CPT | Performed by: PHYSICAL THERAPIST

## 2020-07-02 NOTE — PROGRESS NOTES
PT Evaluation     Today's date: 2020  Patient name: Brennan Grady  : 1990  MRN: [id number]  Referring provider: April Pierre DO  Dx:   Encounter Diagnosis     ICD-10-CM    1  Secondary osteoarthritis M19 93    2  Inflammatory arthritis M19 90    3  Other secondary osteoarthritis of left hip M16 7    4  Greater trochanteric pain syndrome of left lower extremity M25 552                   Assessment  Assessment details: Brennan Grady has been compliant with attending PT and home exercise program since initial eval   Enma Gonzales  has made minimal improvements in objective data since initial eval but continues to have limitations compared to prior level of function  Enma Gonzales continues to have deficits in the above listed impairments and would benefit from additional skilled PT to address these deficits to return to prior level of function      Impairments: abnormal coordination, abnormal gait, abnormal muscle firing, abnormal muscle tone, abnormal or restricted ROM, abnormal movement, activity intolerance, impaired balance, impaired physical strength, lacks appropriate home exercise program, pain with function and weight-bearing intolerance  Understanding of Dx/Px/POC: good   Prognosis: good    Goals  Impairment Goals 4-6 weeks  - Decrease pain to <4/10 -Progressing  - Improve Hip AROM/PROM to functional -Progressing  - Increase knee strength to 5/5 throughout -Progressing  - Increase hip strength to 4/5 throughout -Progressing    Functional Goals 6-8 weeks  - Return to Prior Level of Function -Progressing  - Increase Functional Status Measure (FOTO) to: 65 -Progressing  - Patient will be independent with HEP -Progressing  - Patient will be able to squat with minimally increased pain/compensation/difficulty -Progressing  - Patient will be able to perform sit to stand with minimally increased pain/compensation/difficulty -Progressing  - Patient will be able to ascend and descend stairs with minimally increased pain/compensation/difficulty -Progressing  - Patient will be able to don/doff shoes independently -Progressing  - Patient will be able to walk with minimal pain/compensation -Progressing    Plan  Patient would benefit from: skilled PT  Referral necessary: No  Planned modality interventions: cryotherapy, electrical stimulation/Russian stimulation, H-Wave, TENS, thermotherapy: hydrocollator packs and unattended electrical stimulation  Planned therapy interventions: abdominal trunk stabilization, activity modification, ADL retraining, balance/weight bearing training, breathing training, body mechanics training, coordination, flexibility, functional ROM exercises, graded exercise, home exercise program, work reintegration, therapeutic training, therapeutic exercise, therapeutic activities, stretching, strengthening, self care, postural training, patient education, neuromuscular re-education, muscle pump exercises, motor coordination training, Maurice taping, manual therapy, joint mobilization, IADL retraining, balance and gait training  Frequency: 2x week  Duration in visits: 16  Duration in weeks: 8  Treatment plan discussed with: patient, PTA and referring physician        Subjective Evaluation    Pain  Current pain ratin  At best pain ratin  At worst pain ratin (Goal: 3-4/10 )  Location: left hip pain    Patient Goals  Patient goal: Patient reports that he wants to be able to be somewhat active again and manage the pain   -Progressing slowly       Patient reports that for a while he felt like he was about 50% improved  However, he just got a second injection and feels that he is only at 15-20% improved  Fathers day weekend he had an incident where he bumped his right hip and it hurt the left  Just had a second injection this past Monday  HOBBIES/EXERCISE: Used to be a runner    He reports that he hasn't ran in about 4 years, he reports that he doesn't plan on getting back to running  He would like to just be able to ride a bike, walk or play with his kids  Right now he is pretty sedentary because of the pain  PAIN LOCATION/DESCRIPTORS:  Patient reports pain in the lateral hip on the left, he reports it will pull all the way down to his calf, he feels like his knee will lock and heather give out  He report that he would use the assistive device in the morning to just get going  Pain is sharp, shooting, achy, uncomfortable, difficulty sleeping, intermittent, varies, deep/surface  AGGRAVATING FACTORS:  Rushing up and down stairs, getting in and out of the car, sit to stands, walking on concrete floor at work, Has to walk with hand on his hip in his pocket pressing on the hip  Avoids squatting, playing with kids  He is now able to put his shoes on, but has a lot of pain with don/doff shoes,      Objective     Tenderness     Left Hip   Tenderness in the greater trochanter  Active Range of Motion     Additional Active Range of Motion Details  NT secondary to painful PROM    Passive Range of Motion   Left Hip   Flexion: 106 degrees with pain  Extension: 0 degrees   Abduction: 20 degrees with pain  External rotation (90/90): 38 degrees with pain  Internal rotation (90/90): 20 degrees with pain    Right Hip   Flexion: 100 degrees   Extension: 0 degrees   Abduction: 26 degrees   External rotation (90/90): 45 degrees   Internal rotation (90/90): 25 degrees     Joint Play   Left Hip   Hypomobile in the posterior hip capsule, anterior hip capsule, lateral hip capsule and long axis distraction  Strength/Myotome Testing     Additional Strength Details  Not tested secondary to painful PROM    Tests     Left Hip   Positive CLAUMARJORIE and karol                 Diagnosis: Left hip OA/RA   Precautions: Chron's Disease, RA   Goals: improve hip ROM/strength/ambulation   Manual Therapy 7/2/20 6/16/20 6/18/20 6/23/20   Lateral joint distraction mobs HJS, PT  HJS, PT with flexion HJS, PT Gentle distraction  TJH, PTA   RE HJS, PT                               Exercise Diary         Therapeutic Exercise        Hip flexion stretch 20x  20x  20x   ITB strap stretch [id number]   [id number] 3x30 sec 3x30 sec   Hip ER/IR stretch supine [id number]  +IR: [id number] +IR: 3x30 sec ea +IR: 3x30 sec ea   Hip flexor stretch standing   [id number] 3x30 sec 3x30 sec   Hip add stretch standing        Hamstring stretch [id number]  Supine with strap: 3x30 , seated EOT: 3x30 sec (patient feels more of a stretch with EOT stretch  Proceed with this stretch) Seated EOT  3x30 sec ea Seated EOT  3x30 sec ea           Neuromuscular Re-education        QS with focus on VMO activation   [id number] Quad ladders:   10x Quad ladders:  10x   Standing glut set   Glut ladders: 10x  Glut ladders:  10x Glut ladders:  10x   Bridge 20x  20x 20x On SB 15x   SLR   20x 20x 2x10 ea   clams        Wobble Board Balance 2 mins ea  2 mins ea 2 mins ea 2 mins ea   SLB    3x30 sec ea    Mini squat 20x    15x   Side stepping     At mirror 3 laps   Therapeutic Activities        Pt education HEP, POC reviewed    HEP  Review HEP           Modalities

## 2020-07-07 ENCOUNTER — OFFICE VISIT (OUTPATIENT)
Dept: PHYSICAL THERAPY | Facility: CLINIC | Age: 30
End: 2020-07-07
Payer: COMMERCIAL

## 2020-07-07 DIAGNOSIS — M16.7 OTHER SECONDARY OSTEOARTHRITIS OF LEFT HIP: ICD-10-CM

## 2020-07-07 DIAGNOSIS — M19.90 INFLAMMATORY ARTHRITIS: Primary | ICD-10-CM

## 2020-07-07 DIAGNOSIS — M19.93 SECONDARY OSTEOARTHRITIS: ICD-10-CM

## 2020-07-07 PROCEDURE — 97110 THERAPEUTIC EXERCISES: CPT

## 2020-07-07 PROCEDURE — 97112 NEUROMUSCULAR REEDUCATION: CPT

## 2020-07-07 PROCEDURE — 97140 MANUAL THERAPY 1/> REGIONS: CPT

## 2020-07-07 NOTE — PROGRESS NOTES
Daily Note     Today's date: 2020  Patient name: Kristopher Cedeño  : 1990  MRN: [id number]  Referring provider: Alise Jay DO  Dx:   Encounter Diagnosis     ICD-10-CM    1  Inflammatory arthritis M19 90    2  Other secondary osteoarthritis of left hip M16 7    3  Secondary osteoarthritis M19 93                   Subjective: Pt states that he has some better days and some worse days  He reports today is so far feeling pretty good  Objective: See treatment diary below      Assessment: Tolerated treatment well  Focused on ROM/stretching and manual therapy along with minimal strengthening  Min discomfort reported with SLR, but was able to perform in smaller range with no pain  Tenderness noted in ITB, especially mid point  Decreased pain noted after with improved gait observe  Pt will benefit from continued therapy        Plan: Continue per plan of care     Diagnosis: Left hip OA/RA   Precautions: Chron's Disease, RA   Goals: improve hip ROM/strength/ambulation   Manual Therapy 20   Lateral joint distraction mobs HJS, PT Gentle distraction  TJH, PTA  HJS, PT Gentle distraction  TJH, PTA   RE HJS, PT       IASTM to ITB and hip  TJH, PTA                      Exercise Diary         Therapeutic Exercise        Hip flexion stretch 20x Feet on SB roll into hip flexion: 20x   20x   ITB strap stretch [id number]  3x30 sec  3x30 sec 3x30 sec   Hip ER/IR stretch supine [id number] 3x30 sec ea  +IR: 3x30 sec ea +IR: 3x30 sec ea   Hip flexor stretch standing  3x30 sec  3x30 sec 3x30 sec   Hip add stretch standing        Hamstring stretch [id number]   Seated EOT  3x30 sec ea Seated EOT  3x30 sec ea           Neuromuscular Re-education        QS with focus on VMO activation    Quad ladders:   10x Quad ladders:  10x   Standing glut set  Glut ladders:  10x  Glut ladders:  10x Glut ladders:  10x   Bridge 20x On SB 15x  20x On SB 15x   SLR  10x  20x 2x10 ea   clams        Wobble Board Balance 2 mins ea 2 mins ea  2 mins ea 2 mins ea   SLB    3x30 sec ea    Mini squat 20x 20x   15x   Side stepping     At mirror 3 laps   Therapeutic Activities        Pt education HEP, POC reviewed      Review HEP           Modalities

## 2020-07-09 ENCOUNTER — OFFICE VISIT (OUTPATIENT)
Dept: PHYSICAL THERAPY | Facility: CLINIC | Age: 30
End: 2020-07-09
Payer: COMMERCIAL

## 2020-07-09 DIAGNOSIS — M16.7 OTHER SECONDARY OSTEOARTHRITIS OF LEFT HIP: ICD-10-CM

## 2020-07-09 DIAGNOSIS — M19.90 INFLAMMATORY ARTHRITIS: Primary | ICD-10-CM

## 2020-07-09 PROCEDURE — 97110 THERAPEUTIC EXERCISES: CPT

## 2020-07-09 PROCEDURE — 97140 MANUAL THERAPY 1/> REGIONS: CPT

## 2020-07-09 PROCEDURE — 97112 NEUROMUSCULAR REEDUCATION: CPT

## 2020-07-09 NOTE — PROGRESS NOTES
Daily Note     Today's date: 2020  Patient name: Georgie Crawford  : 1990  MRN: [id number]  Referring provider: Cecilia Estrada DO  Dx:   Encounter Diagnosis     ICD-10-CM    1  Inflammatory arthritis M19 90    2  Other secondary osteoarthritis of left hip M16 7                   Subjective: Pt states that his hip has been feeling a little better since his lv  Objective: See treatment diary below      Assessment: Tolerated treatment well  Progressing pt with strengthening as tolerated with no complaints of increased pain  Less tenderness noted in mid ITB since LV during IATM, with pt tolerating slightly more pressure  Pt reports feeling "the best yet" with decreased pain levels after manual therapy  Discussed HEP and performing ITB stretch every day  Pt reported understanding  He is progressing slowly towards his goals        Plan: Continue per plan of care     Diagnosis: Left hip OA/RA   Precautions: Chron's Disease, RA   Goals: improve hip ROM/strength/ambulation   Manual Therapy 20   Lateral joint distraction mobs HJS, PT Gentle distraction  TJH, PTA Gentle distraction  TJH, PTA  Gentle distraction  TJH, PTA   RE HJS, PT       IASTM to ITB and hip  TJH, PTA TJH, PTA                     Exercise Diary                 Therapeutic Exercise        Hip flexion stretch 20x Feet on SB roll into hip flexion: 20x Feet on SB roll into hip flexion  20x  20x   ITB strap stretch [id number]  3x30 sec 3x30 sec  3x30 sec   Hip ER/IR stretch supine [id number] 3x30 sec ea   +IR: 3x30 sec ea   Hip flexor stretch standing  3x30 sec   3x30 sec   Hip add stretch standing        Hamstring stretch [id number]  3x30 sec  Seated EOT  3x30 sec ea   Upright bike   5 mins     Neuromuscular Re-education        QS with focus on VMO activation     Quad ladders:  10x   Standing glut set  Glut ladders:  10x   Glut ladders:  10x   Bridge 20x On SB 15x On SB 20x  On SB 15x   SLR  10x 15x with cues for QS 2x10 ea   clams   BKFO: RTB 20x     Wobble Board Balance 2 mins ea 2 mins ea 2 mins ea  2 mins ea   SLB        Mini squat 20x 20x 20x  15x   Side stepping   At mirror 3 laps  At mirror 3 laps   Therapeutic Activities        Pt education HEP, POC reviewed      Review HEP           Modalities

## 2020-07-14 ENCOUNTER — APPOINTMENT (OUTPATIENT)
Dept: PHYSICAL THERAPY | Facility: CLINIC | Age: 30
End: 2020-07-14
Payer: COMMERCIAL

## 2020-07-16 ENCOUNTER — APPOINTMENT (OUTPATIENT)
Dept: PHYSICAL THERAPY | Facility: CLINIC | Age: 30
End: 2020-07-16
Payer: COMMERCIAL

## 2020-07-21 ENCOUNTER — APPOINTMENT (OUTPATIENT)
Dept: PHYSICAL THERAPY | Facility: CLINIC | Age: 30
End: 2020-07-21
Payer: COMMERCIAL

## 2020-07-23 ENCOUNTER — APPOINTMENT (OUTPATIENT)
Dept: PHYSICAL THERAPY | Facility: CLINIC | Age: 30
End: 2020-07-23
Payer: COMMERCIAL

## 2020-07-28 ENCOUNTER — APPOINTMENT (OUTPATIENT)
Dept: PHYSICAL THERAPY | Facility: CLINIC | Age: 30
End: 2020-07-28
Payer: COMMERCIAL

## 2020-08-26 NOTE — PROGRESS NOTES
Assessment and Plan:   Mr Soraya Phillips is a 26-year-old male with history significant for an inflammatory arthritis (positive HLA B27 antigen and elevated anti CCP antibody) and possible Crohn's disease, who presents for follow-up of left hip region pain  Shantell Velasco presents today for follow-up of predominantly left hip region pain, with symptoms suggestive of trochanteric bursitis, but without tenderness noted here  The distribution of his symptoms seems less likely to be related to the left hip osteoarthritis, but possibly due to the advanced disease I wonder if he may be having referred pain  He did not respond to an intra-articular hip injection though  - I recommend a multimodal approach to his pain at this time  As the advanced hip osteoarthritis is likely secondary to a primary inflammatory process (most likely a spondyloarthropathy), I would like to start him on DMARDs  He has previously trialed sulfasalazine without any benefit and states that he drinks alcohol on a daily basis which he will be unable to give up, so I will avoid methotrexate use  I am going to start him on leflunomide at 20 mg once daily  I reviewed the side effects with him including but not limited to, risk for infections, need for bone marrow/liver/kidney monitoring in GI upset  He will repeat a CBC and CMP in 4 weeks after starting the leflunomide  I am also going to trial him on NSAIDs, meloxicam 15 mg once daily as needed and advised him not to combine this with any over-the-counter NSAIDs  I would like him to discontinue the steroids if possible  He will also apply topical Voltaren gel to the left trochanteric bursa  - As he appears to be symptomatic with left trochanteric bursitis, I offered him a repeat injection today which he was agreeable to  He tolerated this well  I would like him to continue follow-up with orthopedics as well      - Follow up in 3 months to assess his response to the leflunomide    I advised him to contact me in the interim with any concerns  Large joint arthrocentesis: L greater trochanteric bursa  Date/Time: 8/27/2020 4:08 PM  Consent given by: patient  Site marked: site marked  Timeout: Immediately prior to procedure a time out was called to verify the correct patient, procedure, equipment, support staff and site/side marked as required   Supporting Documentation  Indications: pain   Procedure Details  Location: hip - L greater trochanteric bursa  Preparation: Patient was prepped and draped in the usual sterile fashion  Needle size: 25 G  Ultrasound guidance: no  Approach: lateral    Patient tolerance: patient tolerated the procedure well with no immediate complications  Dressing:  Sterile dressing applied      Plan:  Diagnoses and all orders for this visit:    Inflammatory arthritis  -     leflunomide (ARAVA) 20 MG tablet; Take 1 tablet (20 mg total) by mouth daily  -     meloxicam (MOBIC) 15 mg tablet; Take 1 tablet (15 mg total) by mouth daily as needed for moderate pain    Other secondary osteoarthritis of left hip    HLA B27 positive    History of recent steroid use    Greater trochanteric bursitis of left hip  -     diclofenac sodium (VOLTAREN) 1 %; Apply 2 g topically 3 (three) times a day as needed (Left hip pain)  -     Large joint arthrocentesis  -     triamcinolone acetonide (KENALOG-40) 40 mg/mL injection 40 mg  -     bupivacaine (MARCAINE) 0 5 % injection 4 mL  -     meloxicam (MOBIC) 15 mg tablet; Take 1 tablet (15 mg total) by mouth daily as needed for moderate pain  -     Large joint arthrocentesis: L greater trochanteric bursa    High risk medication use  -     CBC and differential; Future  -     Comprehensive metabolic panel; Future      Activities as tolerated    Diet: low carb/low fat, more greens/vegetables, adequate hydration  Exercise: try to maintain a low impact exercise regimen as much as possible   Walk for 30 minutes a day for at least 3 days a week    Encouraged to maintain good sleep hygiene  Continue other medications as prescribed by PCP and other specialists        RTC in 3 months  HPI    INITIAL VISIT NOTE (3/2020):  Mr Treva Saucedo is a 51-year-old male with history significant for an inflammatory arthritis (positive HLA B27 antigen and elevated anti CCP antibody) and possible Crohn's disease, who presents for further evaluation of left hip pain  He is self referred      Patient reports in 2016 he started to notice intermittent joint pain and swelling affecting his bilateral knees  He established with Rheumatology at [facility] and was seen by Dr Payne Re  He was found to have the positive HLA B27 antigen and anti CCP antibody, with a negative FLORENCIA screen and rheumatoid factor  He was diagnosed with an undifferentiated inflammatory arthritis and started on sulfasalazine as well as prednisone 5-10 mg twice daily  He was on these medications for approximately 2 years after which he was lost to follow-up  He was never started on alternate DMARDs  He states that the prednisone would significantly help with his joint pains, but there was no improvement noted with the sulfasalazine      He mentions after last being seen by Rheumatology in January 2017, he had not been very compliant with follow-up office visits with any of his providers  Overall his joint pains had been stable up until late last year when he started to have recurrence of pain affecting his left hip on the outer aspect with slight radiation into his groin, as well as the outer pain radiating down to his left knee  He was also noticing intermittent pain and swelling affecting his bilateral knees  Other than these three areas, he denies any current or prior history of joint pains affecting his hands, wrists, elbows, shoulders, right hip, ankles, feet or low back region  Other than his bilateral knees he has not noticed swollen joints    He has been experiencing morning stiffness predominantly affecting his left hip which can sometimes persist throughout the day  Any sort of activity will aggravate his symptoms and he does obtain relief with resting  He has tried taking over-the-counter NSAIDs without any relief  He has not been on recent steroids      He was recently evaluated in the emergency room in February 2020 due to these joint pains  An x-ray of his left hip showed complete loss of the joint space with sclerosis and subchondral cyst formation  An x-ray of his left knee showed a joint effusion  He was prescribed diclofenac 50 mg twice daily as needed, which did help with the left knee pain and swelling, but not with the other joint pains  He has since discontinued the medication      He reports in 2012 he had an issue with abdominal pain and diarrhea for which he was evaluated by Gastroenterology and had a colonoscopy done which apparently showed multiple ulcerations  There were concerns for Crohn's disease but he again did not follow-up with Gastroenterology through the years  He states late last year he had a repeat colonoscopy done which was apparently unremarkable (I do not have the records available to me), and there was no confirmation for the Crohn's diagnosis      He denies a history of inflammatory eye disease or psoriasis  He does experience occasional flare-ups of eczema on his arms and legs  This rash usually response to oral prednisone as well  He reports a history of possible rheumatoid arthritis in his mother  No other complaints noted today        8/27/2020:  Patient presents for a follow-up today  We reviewed his hand x-rays which were normal   The labs showed an unremarkable CBC, CMP, ESR, CRP, rheumatoid factor, anti CCP antibody, chronic hepatitis panel, QuantiFERON and uric acid level  He has been managing his symptoms with prednisone 10 mg once daily as needed    He states that this helps him significantly with the left hip region pain and as a maximum the pain may reach up to an intensity of 3/10 on that day  If he skips the prednisone he is usually dealing with pain up to 7/10 in intensity  He describes pain that is mostly on the outer aspect of his left hip region without any radiation throughout his groin or buttocks  He has not noticed any other joint pains, swelling or morning stiffness  He is not taking any over-the-counter NSAIDs at this time  He was seen by Orthopedics and had a left trochanteric bursa (5/28) and intra-articular cortisone (6/29) injection administered  The hip injection did nothing for him, and he only got a few days worth of relief with the trochanteric bursa injection  He also completed a course of physical therapy without any benefit, although it may have helped with his flexibility slightly  He states that other than this region of pain he has not experienced any other complaints  It is significantly interfering with his life  The following portions of the patient's history were reviewed and updated as appropriate: allergies, current medications, past family history, past medical history, past social history, past surgical history and problem list       Review of Systems  Constitutional: Negative for weight change, fevers, chills, night sweats, fatigue  ENT/Mouth: Negative for hearing changes, ear pain, nasal congestion, sinus pain, hoarseness, sore throat, rhinorrhea, swallowing difficulty  Eyes: Negative for pain, redness, discharge, vision changes  Cardiovascular: Negative for chest pain, SOB, palpitations  Respiratory: Negative for cough, sputum, wheezing, dyspnea  Gastrointestinal: Negative for nausea, vomiting, diarrhea, constipation, pain, heartburn  Genitourinary: Negative for dysuria, urinary frequency, hematuria  Musculoskeletal: As per HPI  Skin: Negative for skin rash, color changes  Neuro: Negative for weakness, numbness, tingling, loss of consciousness     Psych: Negative for anxiety, depression  Heme/Lymph: Negative for easy bruising, bleeding, lymphadenopathy          Past Medical History:   Diagnosis Date    Crohn's disease [facility])        Past Surgical History:   Procedure Laterality Date    APPENDECTOMY      WISDOM TOOTH EXTRACTION      WRIST SURGERY Left        Social History     Socioeconomic History    Marital status: Single     Spouse name: Not on file    Number of children: Not on file    Years of education: Not on file    Highest education level: Not on file   Occupational History    Not on file   Social Needs    Financial resource strain: Not on file    Food insecurity     Worry: Not on file     Inability: Not on file    Transportation needs     Medical: Not on file     Non-medical: Not on file   Tobacco Use    Smoking status: Current Every Day Smoker     Types: Cigarettes    Smokeless tobacco: Never Used   Substance and Sexual Activity    Alcohol use: Yes     Frequency: 2-3 times a week    Drug use: Yes     Types: Marijuana     Comment: medical marijuana    Sexual activity: Not on file   Lifestyle    Physical activity     Days per week: Not on file     Minutes per session: Not on file    Stress: Not on file   Relationships    Social connections     Talks on phone: Not on file     Gets together: Not on file     Attends Spiritism service: Not on file     Active member of club or organization: Not on file     Attends meetings of clubs or organizations: Not on file     Relationship status: Not on file    Intimate partner violence     Fear of current or ex partner: Not on file     Emotionally abused: Not on file     Physically abused: Not on file     Forced sexual activity: Not on file   Other Topics Concern    Not on file   Social History Narrative    Not on file       Family History   Problem Relation Age of Onset    Arthritis Mother     Hypertension Mother        No Known Allergies      Current Outpatient Medications:     diclofenac sodium (VOLTAREN) 1 %, Apply 2 g topically 3 (three) times a day as needed (Left hip pain), Disp: 1 Tube, Rfl: 2    leflunomide (ARAVA) 20 MG tablet, Take 1 tablet (20 mg total) by mouth daily, Disp: 30 tablet, Rfl: 2    meloxicam (MOBIC) 15 mg tablet, Take 1 tablet (15 mg total) by mouth daily as needed for moderate pain, Disp: 30 tablet, Rfl: 0  No current facility-administered medications for this visit  Objective:    Vitals:    08/27/20 1518   BP: 148/78   BP Location: Right arm   Patient Position: Sitting   Cuff Size: Adult   Pulse: 96   Temp: 98 4 °F (36 9 °C)   Weight: 77 6 kg (171 lb)       Physical Exam  General: Well appearing, well nourished, in no distress  Oriented x 3, normal mood and affect  Ambulating without difficulty  Skin: Good turgor, no rash, unusual bruising or prominent lesions  Nails: Normal color, no deformities  HEENT:  Head: Normocephalic, atraumatic  Eyes: Conjunctiva clear, sclera non-icteric, EOM intact  Extremities: No amputations or deformities, cyanosis, edema  Musculoskeletal:   No significant left trochanteric bursa tenderness noted  Neurologic: Alert and oriented  No focal neurological deficits appreciated  Psychiatric: Normal mood and affect  DEBRA Turner    Rheumatology

## 2020-08-27 ENCOUNTER — OFFICE VISIT (OUTPATIENT)
Dept: RHEUMATOLOGY | Facility: CLINIC | Age: 30
End: 2020-08-27
Payer: COMMERCIAL

## 2020-08-27 VITALS
BODY MASS INDEX: 23.19 KG/M2 | DIASTOLIC BLOOD PRESSURE: 78 MMHG | SYSTOLIC BLOOD PRESSURE: 148 MMHG | TEMPERATURE: 98.4 F | WEIGHT: 171 LBS | HEART RATE: 96 BPM

## 2020-08-27 DIAGNOSIS — Z15.89 HLA B27 POSITIVE: ICD-10-CM

## 2020-08-27 DIAGNOSIS — Z92.241 HISTORY OF RECENT STEROID USE: ICD-10-CM

## 2020-08-27 DIAGNOSIS — M19.90 INFLAMMATORY ARTHRITIS: Primary | ICD-10-CM

## 2020-08-27 DIAGNOSIS — Z79.899 HIGH RISK MEDICATION USE: ICD-10-CM

## 2020-08-27 DIAGNOSIS — M16.7 OTHER SECONDARY OSTEOARTHRITIS OF LEFT HIP: ICD-10-CM

## 2020-08-27 DIAGNOSIS — M70.62 GREATER TROCHANTERIC BURSITIS OF LEFT HIP: ICD-10-CM

## 2020-08-27 PROCEDURE — 20610 DRAIN/INJ JOINT/BURSA W/O US: CPT | Performed by: INTERNAL MEDICINE

## 2020-08-27 PROCEDURE — 99215 OFFICE O/P EST HI 40 MIN: CPT | Performed by: INTERNAL MEDICINE

## 2020-08-27 RX ORDER — BUPIVACAINE HYDROCHLORIDE 5 MG/ML
4 INJECTION, SOLUTION PERINEURAL ONCE
Status: COMPLETED | OUTPATIENT
Start: 2020-08-27 | End: 2020-08-27

## 2020-08-27 RX ORDER — LEFLUNOMIDE 20 MG/1
20 TABLET ORAL DAILY
Qty: 30 TABLET | Refills: 2 | Status: SHIPPED | OUTPATIENT
Start: 2020-08-27 | End: 2020-10-15 | Stop reason: SDUPTHER

## 2020-08-27 RX ORDER — MELOXICAM 15 MG/1
15 TABLET ORAL DAILY PRN
Qty: 30 TABLET | Refills: 0 | Status: SHIPPED | OUTPATIENT
Start: 2020-08-27 | End: 2020-10-15 | Stop reason: SDUPTHER

## 2020-08-27 RX ORDER — TRIAMCINOLONE ACETONIDE 40 MG/ML
40 INJECTION, SUSPENSION INTRA-ARTICULAR; INTRAMUSCULAR ONCE
Status: COMPLETED | OUTPATIENT
Start: 2020-08-27 | End: 2020-08-27

## 2020-08-27 RX ADMIN — BUPIVACAINE HYDROCHLORIDE 4 ML: 5 INJECTION, SOLUTION PERINEURAL at 16:04

## 2020-08-27 RX ADMIN — TRIAMCINOLONE ACETONIDE 40 MG: 40 INJECTION, SUSPENSION INTRA-ARTICULAR; INTRAMUSCULAR at 16:05

## 2020-09-20 ENCOUNTER — HOSPITAL ENCOUNTER (EMERGENCY)
Facility: HOSPITAL | Age: 30
Discharge: HOME | End: 2020-09-20
Attending: EMERGENCY MEDICINE | Admitting: EMERGENCY MEDICINE
Payer: COMMERCIAL

## 2020-09-20 VITALS
RESPIRATION RATE: 18 BRPM | HEART RATE: 92 BPM | DIASTOLIC BLOOD PRESSURE: 87 MMHG | SYSTOLIC BLOOD PRESSURE: 142 MMHG | OXYGEN SATURATION: 100 % | TEMPERATURE: 98.1 F

## 2020-09-20 DIAGNOSIS — K08.89 PAIN, DENTAL: ICD-10-CM

## 2020-09-20 DIAGNOSIS — K02.9 DENTAL CARIES: Primary | ICD-10-CM

## 2020-09-20 PROCEDURE — 99282 EMERGENCY DEPT VISIT SF MDM: CPT | Performed by: EMERGENCY MEDICINE

## 2020-09-20 PROCEDURE — 99282 EMERGENCY DEPT VISIT SF MDM: CPT

## 2020-09-20 RX ORDER — PENICILLIN V POTASSIUM 500 MG/1
500 TABLET ORAL 4 TIMES DAILY
Qty: 28 TABLET | Refills: 0 | Status: SHIPPED | OUTPATIENT
Start: 2020-09-20 | End: 2020-09-27

## 2020-09-20 RX ORDER — PENICILLIN V POTASSIUM 250 MG/1
500 TABLET ORAL ONCE
Status: COMPLETED | OUTPATIENT
Start: 2020-09-20 | End: 2020-09-20

## 2020-09-20 RX ORDER — CHLORHEXIDINE GLUCONATE 0.12 MG/ML
15 RINSE ORAL 2 TIMES DAILY
Qty: 120 ML | Refills: 0 | Status: SHIPPED | OUTPATIENT
Start: 2020-09-20

## 2020-09-20 RX ORDER — ACETAMINOPHEN 325 MG/1
975 TABLET ORAL ONCE
Status: COMPLETED | OUTPATIENT
Start: 2020-09-20 | End: 2020-09-20

## 2020-09-20 RX ORDER — IBUPROFEN 400 MG/1
400 TABLET ORAL ONCE
Status: COMPLETED | OUTPATIENT
Start: 2020-09-20 | End: 2020-09-20

## 2020-09-20 RX ORDER — ACETAMINOPHEN 500 MG
500 TABLET ORAL EVERY 6 HOURS PRN
COMMUNITY

## 2020-09-20 RX ADMIN — IBUPROFEN 400 MG: 400 TABLET ORAL at 15:05

## 2020-09-20 RX ADMIN — ACETAMINOPHEN 975 MG: 325 TABLET, FILM COATED ORAL at 15:05

## 2020-09-20 RX ADMIN — PENICILLIN V POTASSIUM 500 MG: 250 TABLET, FILM COATED ORAL at 15:05

## 2020-09-20 NOTE — ED PROVIDER NOTES
History  Chief Complaint   Patient presents with    Dental Pain     pt reports hx of problems with R lower missing teeth reports 3 day pain with R lower molars and swelling around area         22-year-old male with past medical history of HLA-B27 positivity with polyarthritis and possible Crohn's disease who is presenting with dental pain  Patient states that it has been many years since he has followed up with a dentist   He reports pain associated with his right mandibular molars  He has numerous dental caries  He denies any fevers or chills  No difficulty opening his mouth, speaking, or swallowing  No neck pain or neck stiffness  No facial swelling  Patient took Tylenol earlier this morning without relief of symptoms  He has not tried any other medications  No other complaints on review of systems  Prior to Admission Medications   Prescriptions Last Dose Informant Patient Reported? Taking?   acetaminophen (TYLENOL) 500 mg tablet 9/20/2020 at 0900 Self Yes Yes   Sig: Take 500 mg by mouth every 6 (six) hours as needed for mild pain   diclofenac sodium (VOLTAREN) 1 %   No No   Sig: Apply 2 g topically 3 (three) times a day as needed (Left hip pain)   leflunomide (ARAVA) 20 MG tablet Not Taking at Unknown time  No No   Sig: Take 1 tablet (20 mg total) by mouth daily   Patient not taking: Reported on 9/20/2020   meloxicam (MOBIC) 15 mg tablet   No No   Sig: Take 1 tablet (15 mg total) by mouth daily as needed for moderate pain      Facility-Administered Medications: None       Past Medical History:   Diagnosis Date    Crohn's disease ([facility] [address] )        Past Surgical History:   Procedure Laterality Date    APPENDECTOMY      WISDOM TOOTH EXTRACTION      WRIST SURGERY Left        Family History   Problem Relation Age of Onset    Arthritis Mother     Hypertension Mother      I have reviewed and agree with the history as documented      E-Cigarette/Vaping    E-Cigarette Use Never User E-Cigarette/Vaping Substances    Nicotine No     THC No     CBD No     Flavoring No     Other No     Unknown No      Social History     Tobacco Use    Smoking status: Current Every Day Smoker     Types: Cigarettes    Smokeless tobacco: Never Used   Substance Use Topics    Alcohol use: Yes     Frequency: 2-3 times a week    Drug use: Yes     Types: Marijuana     Comment: medical marijuana       Review of Systems   Constitutional: Negative for diaphoresis, fever and unexpected weight change  HENT: Positive for dental problem  Negative for congestion, rhinorrhea and sore throat  Eyes: Negative for pain, discharge and visual disturbance  Respiratory: Negative for cough, shortness of breath and wheezing  Cardiovascular: Negative for chest pain, palpitations and leg swelling  Gastrointestinal: Negative for abdominal pain, blood in stool, constipation, diarrhea, nausea and vomiting  Genitourinary: Negative for dysuria, flank pain and hematuria  Musculoskeletal: Negative for arthralgias and myalgias  Skin: Negative for rash and wound  Allergic/Immunologic: Negative for environmental allergies and food allergies  Neurological: Negative for dizziness, seizures, weakness and numbness  Hematological: Negative for adenopathy  Psychiatric/Behavioral: Negative for confusion and hallucinations  Physical Exam  Physical Exam  Vitals signs and nursing note reviewed  Constitutional:       Appearance: He is well-developed  He is not diaphoretic  HENT:      Head: Normocephalic and atraumatic  Right Ear: External ear normal       Left Ear: External ear normal       Nose: Nose normal       Mouth/Throat:      Mouth: Mucous membranes are moist       Dentition: Dental caries present  No gingival swelling or dental abscesses  Comments: Patient has poor dentition with numerous dental caries  He has pain with percussion of tooth number 31    There is no swelling of the floor of the mouth   No submandibular or submental swelling or induration to suggest Elmer's angina  No trismus  Eyes:      Pupils: Pupils are equal, round, and reactive to light  Cardiovascular:      Rate and Rhythm: Normal rate and regular rhythm  Pulmonary:      Effort: Pulmonary effort is normal  No respiratory distress  Musculoskeletal: Normal range of motion  General: No deformity  Skin:     General: Skin is warm and dry  Capillary Refill: Capillary refill takes less than 2 seconds  Neurological:      Mental Status: He is alert and oriented to person, place, and time  Comments: No gross motor deficits noted  Cranial nerves II-XII are intact  Speech is fluent without dysarthria or aphasia  Psychiatric:         Mood and Affect: Mood normal          Behavior: Behavior normal          Vital Signs  ED Triage Vitals [09/20/20 1445]   Temperature Pulse Respirations Blood Pressure SpO2   98 1 °F (36 7 °C) 92 18 142/87 100 %      Temp Source Heart Rate Source Patient Position - Orthostatic VS BP Location FiO2 (%)   Tympanic Monitor Lying Right arm --      Pain Score       Worst Possible Pain           Vitals:    09/20/20 1445   BP: 142/87   Pulse: 92   Patient Position - Orthostatic VS: Lying         Visual Acuity      ED Medications  Medications   acetaminophen (TYLENOL) tablet 975 mg (has no administration in time range)   ibuprofen (MOTRIN) tablet 400 mg (has no administration in time range)   penicillin V potassium (VEETID) tablet 500 mg (has no administration in time range)       Diagnostic Studies  Results Reviewed     None                 No orders to display              Procedures  Procedures         ED Course                           SBIRT 22yo+      Most Recent Value   SBIRT (22 yo +)   In order to provide better care to our patients, we are screening all of our patients for alcohol and drug use  Would it be okay to ask you these screening questions?   Yes Filed at: 09/20/2020 6025 Initial Alcohol Screen: US AUDIT-C    1  How often do you have a drink containing alcohol? 3 Filed at: 09/20/2020 1448   2  How many drinks containing alcohol do you have on a typical day you are drinking? 1 Filed at: 09/20/2020 1448   3a  Male UNDER 65: How often do you have five or more drinks on one occasion? 1 Filed at: 09/20/2020 1448   3b  FEMALE Any Age, or MALE 65+: How often do you have 4 or more drinks on one occassion? 1 Filed at: 09/20/2020 1448   Audit-C Score  6 Filed at: 09/20/2020 1448   Full Alcohol Screen: US AUDIT   4  How often during the last year have you found that you were not able to stop drinking once you had started? 0 Filed at: 09/20/2020 1448   5  How often during past year have you failed to do what was normally expected of you because of drinking? 0 Filed at: 09/20/2020 1448   6  How often in past year have you needed a first drink in the morning to get yourself going after a heavy drinking session? 0 Filed at: 09/20/2020 1448   7  How often in past year have you had feeling of guilt or remorse after drinking? 0 Filed at: 09/20/2020 1448   8  How often in past year have you been unable to remember what happened night before because you had been drinking? 0 Filed at: 09/20/2020 1448   9  Have you or someone else been injured as a result of your drinking? 0 Filed at: 09/20/2020 1448   10  Has a relative, friend, doctor or other health worker been concerned about your drinking and suggested you cut down?   0 Filed at: 09/20/2020 1448   AUDIT Total Score  6 Filed at: 09/20/2020 1448   JAYCE: How many times in the past year have you    Used an illegal drug or used a prescription medication for non-medical reasons?   Never Filed at: 09/20/2020 1448                  MDM  Number of Diagnoses or Management Options  Dental caries: established and worsening  Pain, dental: established and worsening  Diagnosis management comments:     Patient presented for evaluation of dental pain as detailed above  On examination, he had poor dentition, with numerous dental caries  There was no evidence of Elmer's angina or other deep space neck infection on physical examination  No evidence of dental abscess  The patient was prescribed penicillin and Peridex  He was advised to use Tylenol and ibuprofen for pain control  He was provided with a list of dental clinics and advised to follow up as soon as possible  Return precautions were discussed  Patient verbalized understanding  Amount and/or Complexity of Data Reviewed  Decide to obtain previous medical records or to obtain history from someone other than the patient: yes  Review and summarize past medical records: yes    Risk of Complications, Morbidity, and/or Mortality  Presenting problems: minimal  Diagnostic procedures: minimal  Management options: minimal    Patient Progress  Patient progress: improved      Disposition  Final diagnoses:   Dental caries   Pain, dental     Time reflects when diagnosis was documented in both MDM as applicable and the Disposition within this note     Time User Action Codes Description Comment    9/20/2020  2:59 PM Rossana Jim Hogg Add [K02 9] Dental caries     9/20/2020  2:59 PM Rossana Jim Hogg Add [K08 89] Pain, dental       ED Disposition     ED Disposition Condition Date/Time Comment    Discharge Good Sun Sep 20, 2020  2:59 PM Brennan Grady discharge to home/self care  Follow-up Information     Follow up With Specialties Details Why Contact Info Additional Information    Dentist  Call  Please call the clinics on the list that I provided you  [facility] Emergency Medicine Go to  If symptoms worsen   [address] [id number]  [facility], [address], [place] [address]          Patient's Medications   Discharge Prescriptions    CHLORHEXIDINE (PERIDEX) 0 12 % SOLUTION    Apply 15 mL to the mouth or throat 2 (two) times a day Swish and spit 2 times daily  Start Date: 9/20/2020 End Date: --       Order Dose: 15 mL       Quantity: 120 mL    Refills: 0    PENICILLIN V POTASSIUM (VEETID) 500 MG TABLET    Take 1 tablet (500 mg total) by mouth 4 (four) times a day for 7 days       Start Date: 9/20/2020 End Date: 9/27/2020       Order Dose: 500 mg       Quantity: 28 tablet    Refills: 0     No discharge procedures on file      PDMP Review     None          ED Provider  Electronically Signed by           Cullen Leigh MD  09/20/20 8824

## 2020-10-15 ENCOUNTER — TELEPHONE (OUTPATIENT)
Dept: OBGYN CLINIC | Facility: HOSPITAL | Age: 30
End: 2020-10-15

## 2020-10-15 DIAGNOSIS — M70.62 GREATER TROCHANTERIC BURSITIS OF LEFT HIP: ICD-10-CM

## 2020-10-15 DIAGNOSIS — M19.90 INFLAMMATORY ARTHRITIS: ICD-10-CM

## 2020-10-15 RX ORDER — MELOXICAM 15 MG/1
15 TABLET ORAL DAILY PRN
Qty: 30 TABLET | Refills: 0 | Status: SHIPPED | OUTPATIENT
Start: 2020-10-15

## 2020-10-15 RX ORDER — LEFLUNOMIDE 20 MG/1
20 TABLET ORAL DAILY
Qty: 30 TABLET | Refills: 2 | Status: SHIPPED | OUTPATIENT
Start: 2020-10-15

## 2021-01-06 DIAGNOSIS — M19.90 INFLAMMATORY ARTHRITIS: ICD-10-CM

## 2021-01-06 RX ORDER — LEFLUNOMIDE 20 MG/1
TABLET ORAL
Qty: 90 TABLET | OUTPATIENT
Start: 2021-01-06

## 2021-06-02 ENCOUNTER — TELEPHONE (OUTPATIENT)
Dept: OBGYN CLINIC | Facility: OTHER | Age: 31
End: 2021-06-02

## 2021-06-02 NOTE — TELEPHONE ENCOUNTER
Patient is calling in requesting Medical Records from Dr Kaye Menendez to be faxed  I advised him that typically the office where he is going which is in [facility], would send a Medical Records Request to us , then we can send them  I provided our fax numenr for Dr Jerod Villar office in [place]      He states he has the appointment on Friday     Office in [facility] fax number    Fax # [phone]    C/b # [phone]
